# Patient Record
(demographics unavailable — no encounter records)

---

## 2025-01-16 NOTE — REVIEW OF SYSTEMS
[Fever] : no fever [Sore Throat] : no sore throat [Chest Pain] : no chest pain [Shortness Of Breath] : no shortness of breath [Wheezing] : no wheezing [Cough] : no cough [Abdominal Pain] : no abdominal pain [Vomiting] : no vomiting [Constipation] : no constipation [Easy Bleeding] : no tendency for easy bleeding [Easy Bruising] : no tendency for easy bruising

## 2025-01-16 NOTE — HISTORY OF PRESENT ILLNESS
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 The malformation is causing the patient to stay out of all activities and causing her severe pain where she has been to [facility] twice. In the past the knee has become swollen with fluid every time she would have a strep throat infection. Knee was never drained during flare ups.   She has been seen by Dr. Lashay Lewis (ortho) and Dr. Marisa Charlton (radiology) with no procedure completed.    Patient denies recent fever, chills, shortness of breath, chest pain, nausea, vomiting or diarrhea.

## 2025-01-16 NOTE — HISTORY OF PRESENT ILLNESS
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 The malformation is causing the patient to stay out of all activities and causing her severe pain where she has been to [facility] twice. In the past the knee has become swollen with fluid every time she would have a strep throat infection. Knee was never drained during flare ups.   She has been seen by Dr. Lashay Lewis (ortho) and Dr. Marisa Charlton (radiology) with no procedure completed.    Patient denies recent fever, chills, shortness of breath, chest pain, nausea, vomiting or diarrhea.

## 2025-01-16 NOTE — HISTORY OF PRESENT ILLNESS
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 The malformation is causing the patient to stay out of all activities and causing her severe pain where she has been to [facility] twice. In the past the knee has become swollen with fluid every time she would have a strep throat infection. Knee was never drained during flare ups.   She has been seen by Dr. Lashay Lewis (ortho) and Dr. Marisa Charlton (radiology) with no procedure completed.    Patient denies recent fever, chills, shortness of breath, chest pain, nausea, vomiting or diarrhea.

## 2025-01-16 NOTE — CONSULT LETTER
[Dear  ___] : Dear  [unfilled], [Consult Letter:] : I had the pleasure of evaluating your patient, [unfilled]. [Please see my note below.] : Please see my note below. [Consult Closing:] : Thank you very much for allowing me to participate in the care of this patient.  If you have any questions, please do not hesitate to contact me. [Sincerely,] : Sincerely, [FreeTextEntry3] : Juan Diego Banda MD, FSIR, FACR Interventional Radiologist   Executive  Department of Radiology- [facility]    Professor of Radiology [employer] at [facility]  [DrLebron  ___] : Dr. WHITTEN

## 2025-01-16 NOTE — PHYSICAL EXAM
[Alert] : alert [Well Nourished] : well nourished [No Respiratory Distress] : no respiratory distress [No Accessory Muscle Use] : no accessory muscle use [Oriented x3] : oriented to person, place, and time [Normal Affect] : the affect was normal [Restricted in physically strenuous activity but ambulatory and able to carry out work of a light or sedentary nature] : Restricted in physically strenuous activity but ambulatory and able to carry out work of a light or sedentary nature, e.g., light house work, office work [de-identified] : left knee malformation noted on exam. Left foot malformation examined as well. Both malformation are blue in color.

## 2025-01-16 NOTE — CONSULT LETTER
[Dear  ___] : Dear  [unfilled], [Consult Letter:] : I had the pleasure of evaluating your patient, [unfilled]. [Please see my note below.] : Please see my note below. [Consult Closing:] : Thank you very much for allowing me to participate in the care of this patient.  If you have any questions, please do not hesitate to contact me. [Sincerely,] : Sincerely, [FreeTextEntry3] : Juan Diego Banda MD, FSIR, FACR Interventional Radiologist   Executive  Department of Radiology- [facility]    Professor of Radiology [employer] at [facility]  [DrLebron  ___] : Dr. WHITTEN

## 2025-01-16 NOTE — PHYSICAL EXAM
[Alert] : alert [Well Nourished] : well nourished [No Respiratory Distress] : no respiratory distress [No Accessory Muscle Use] : no accessory muscle use [Oriented x3] : oriented to person, place, and time [Normal Affect] : the affect was normal [Restricted in physically strenuous activity but ambulatory and able to carry out work of a light or sedentary nature] : Restricted in physically strenuous activity but ambulatory and able to carry out work of a light or sedentary nature, e.g., light house work, office work [de-identified] : left knee malformation noted on exam. Left foot malformation examined as well. Both malformation are blue in color.

## 2025-01-16 NOTE — REASON FOR VISIT
[Consultation] : a consultation visit [Parent] : parent [FreeTextEntry1] : left knee vascular malformation

## 2025-01-16 NOTE — DATA REVIEWED
[FreeTextEntry1] : MR reviewed with the patient and parents.  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

## 2025-01-16 NOTE — ASSESSMENT
[FreeTextEntry1] : I met with the patient and her parents and reviewed her MRI imaging and assured them that this was a vascular anomaly.  I explained that this is rather extensive disease and there is really no opportunity to "cure" her problem and that therapy for these type of vascular anomalies as directed at symptom relief and control/minimization.  We reviewed the entity of venous malformations and their natural history and there is no question that this patient continues to worsen in terms of her pain and functional ability of the lower extremity.  As there is an intra-articular component to this venous lesion and sclerosant's can damage cartilage extensively, I suggested that we consider a combined procedure with Xavier Cox of orthopedic surgery where we would first perform sclerotherapy and injection of hemostatic agent into the malformation followed by irrigation of the knee joint and possibly resection of a portion of the malformation the same day.  I reviewed some of the risks benefits and alternatives of the procedure and we discussed that therapy for venous anomalies can be somewhat unpredictable but if this goes well, we would consider sclerotherapy for some of the other lesions that are bothering her.  Clearly at this point, the knee is the biggest problem and we furnished them with Dr. Cox's contact information so that they could see him and potentially begin to set up the procedure, if he agrees.  Additionally, we spoke about the use of compression, and we prescribed a compression garment for her which surprisingly, no and that has seen her has discussed with her in the past.  Following a visit with Dr. Cox, we will coordinate with his office to set up the proposed procedure.

## 2025-01-16 NOTE — PHYSICAL EXAM
[Alert] : alert [Well Nourished] : well nourished [No Respiratory Distress] : no respiratory distress [No Accessory Muscle Use] : no accessory muscle use [Oriented x3] : oriented to person, place, and time [Normal Affect] : the affect was normal [Restricted in physically strenuous activity but ambulatory and able to carry out work of a light or sedentary nature] : Restricted in physically strenuous activity but ambulatory and able to carry out work of a light or sedentary nature, e.g., light house work, office work [de-identified] : left knee malformation noted on exam. Left foot malformation examined as well. Both malformation are blue in color.

## 2025-01-16 NOTE — CONSULT LETTER
[Dear  ___] : Dear  [unfilled], [Consult Letter:] : I had the pleasure of evaluating your patient, [unfilled]. [Please see my note below.] : Please see my note below. [Consult Closing:] : Thank you very much for allowing me to participate in the care of this patient.  If you have any questions, please do not hesitate to contact me. [Sincerely,] : Sincerely, [FreeTextEntry3] : Juan Diego Banda MD, FSIR, FACR Interventional Radiologist   Executive  Department of Radiology- [facility]    Professor of Radiology [employer] at [facility]  [DrLebron  ___] : Dr. WHITTEN

## 2025-02-05 NOTE — REVIEW OF SYSTEMS
[Rash] : rash [NI] : Endocrine [Nl] : Hematologic/Lymphatic [Change in Activity] : no change in activity [Fever Above 102] : no fever [Malaise] : no malaise [Cough] : no cough [Headache] : no headache

## 2025-02-05 NOTE — PHYSICAL EXAM
[FreeTextEntry1] : Healthy appearing 14 year-old child. Awake, alert, in no acute distress. Pleasant and cooperative.  Eyes are clear with no sclera abnormalities. External ears, nose and mouth are clear.  Good respiratory effort with no audible wheezing without use of a stethoscope. Ambulates independently with no evidence of antalgia. Good coordination and balance. Able to get on and off exam table without difficulty.   Left lower extremity:  + increased vascular markings with swelling to anterior knee, posterior thigh, posterior calf and lateral dorsal aspect of the foot.  There is some mild discomfort with palpation of the vascularity to the anterior aspect of her knee. She has full range of motion about the hip, knee and ankle joints.  Sensation intact to light touch distally, brisk capillary refill in all digits

## 2025-02-05 NOTE — HISTORY OF PRESENT ILLNESS
[FreeTextEntry1] : Marisa is a 14-year-old young lady who has history for vascular malformation of her left lower extremity.  She was recently seen by Dr. Juan Diego Banda 1 month ago who recommended she follow-up in our office.  She explains that she will be undergoing a sclerotherapy procedure with Dr. Banda and he would like to work in conjunction with our office for possible arthroscopy and excision of the lesion.  She states that she has had increased vascularity's to her lower extremity since she was born.  They are approximately over the posterior aspect of her left thigh, the anterior aspect of the left knee and the posterior aspect of her left lower leg.  Vascularity is also run to the lateral and dorsum of her foot.  She does experience discomfort and swelling in the areas of these vascularities with the anterior knee segment being the most bothersome for her.  Otherwise, she is in her usual state of health.  Here today for further pediatric orthopedic evaluation and management.

## 2025-02-05 NOTE — REASON FOR VISIT
[Consultation] : a consultation visit [Patient] : patient [Parents] : parents [FreeTextEntry1] : Vascular Malformation of Left lower extremity

## 2025-02-05 NOTE — ASSESSMENT
[FreeTextEntry1] : 14-year-old female with left lower extremity vascular malformation with knee pain  The history was obtained today from the child and parent; given the patient's age and/or the child's mental capacity, the history was unreliable and the parent was used as an independent historian.   Marisa and her family have met with Dr. Banda and are planning to undergo sclerotherapy.  We would be working in conjunction with Dr. Banda to perform a possible left knee arthroscopy and resection of a portion of the malformation.  In addition to interventional radiology and our team, I would also like for her to be evaluated by heme-onc team, Dr. Mandel.  I provided family with the contact information as we often work closely with this team for preparing for her surgical care.  I will be in touch with Dr. Mandel and Dr. Banda and we will move forward with the procedure together. This plan was discussed with family and all questions and concerns were addressed today.  I, Sumaya Grace PA-C, have acted as a scribe and documented the above for Dr. Cox  The above documentation completed by the scribe is an accurate record of both my words and actions.

## 2025-03-09 NOTE — HISTORY OF PRESENT ILLNESS
[de-identified] : Marisa presents at 14-years old for consultation for L knee lesion. Mother reports that lesion was noted at birth but was informed "it was ok." As she walks her knee swells, causing limping. Mother reports that "she got nowhere for years" regarding an evaluation. She notes that whenever Marisa has strep throat, her knee would swell and become fluid filled. She was previously seen by an Infectious Disease physician, who advised not to tap her knee as there wasn't an infectious concern. She takes Tylenol or Advil for pain multiple times per day, sometimes just once weekly.   Mother states that Marisa "has no quality of life." She misses school, knee beverly or swells when standing for too long. Pain is 10/10 during a flare up, otherwise baseline is 3-4/10 even when laying down. No flare ups with menses.   She has had several previous evaluations. She was seen at [place] by a vascular group. Mother reports Doppler was done and "veins go nowhere." She was evaluated by cardiology and per mother, her heart is not affected. 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 Marisa took weekly injections in 2019. In August 2024, she slipped and fell on her knee. She was seen at [place] and then by Orthopedist Dr. Guo, who requested MRI, which was done at [facility] August 2024, then saw Dr. Dinesh Calix at [place], who requested another MRI September 2024 and referred to Dr. Marisa Castanon at [place] [facility]. She suggested family call [facility] (family was informed next visit was months away). Mother contacted Dr. Banda, (IR) who also reerred to Dr. Cox (ortho) at [facility]. In December 2024 she had a sudden pain in her whole leg and fell. She was seen at [facility]. She reports EKG, blood work and a Doppler, which was negative. She reports platelets in the 30s. No report of epistaxis, gum bleeding, blood in her urine or stool.    Menarche at 11. She reports being a "heavy bleeder like her mother and sisters." She uses 3-5 pads/day, soaking or 4 days. Mom states that mother is probably anemic.   Due to her leg pain she is not very active. Marisa prefers to sing and read poetry (Jason Flor Sergio is her favorite).  Family history of clots.  - Maternal uncle with a "clot disorder," and has a [place] filter - maternal cousin with PEs and another cousin with clots  - mother - 1 miscarriage - sister - 3-4 miscarraiges

## 2025-03-09 NOTE — HISTORY OF PRESENT ILLNESS
[de-identified] : Marisa presents at 14-years old for consultation for L knee lesion. Mother reports that lesion was noted at birth but was informed "it was ok." As she walks her knee swells, causing limping. Mother reports that "she got nowhere for years" regarding an evaluation. She notes that whenever Marisa has strep throat, her knee would swell and become fluid filled. She was previously seen by an Infectious Disease physician, who advised not to tap her knee as there wasn't an infectious concern. She takes Tylenol or Advil for pain multiple times per day, sometimes just once weekly.   Mother states that Marisa "has no quality of life." She misses school, knee beverly or swells when standing for too long. Pain is 10/10 during a flare up, otherwise baseline is 3-4/10 even when laying down. No flare ups with menses.   She has had several previous evaluations. She was seen at [place] by a vascular group. Mother reports Doppler was done and "veins go nowhere." She was evaluated by cardiology and per mother, her heart is not affected. 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 Marisa took weekly injections in 2019. In August 2024, she slipped and fell on her knee. She was seen at [place] and then by Orthopedist Dr. Guo, who requested MRI, which was done at [facility] August 2024, then saw Dr. Dinesh Calix at [place], who requested another MRI September 2024 and referred to Dr. Marisa Castanon at [place] [facility]. She suggested family call [facility] (family was informed next visit was months away). Mother contacted Dr. Banda, (IR) who also reerred to Dr. Cox ([place] [facility]. In December 2024 she had a sudden pain in her whole leg and fell. She was seen at [facility]. She reports EKG, blood work and a Doppler, which was negative. She reports platelets in the 30s. No report of epistaxis, gum bleeding, blood in her urine or stool.    Menarche at 11. She reports being a "heavy bleeder like her mother and sisters." She uses 3-5 pads/day, soaking or 4 days. Mom states that mother is probably anemic.   Due to her leg pain she is not very active. Marisa prefers to sing and read poetry (Jason Flor Sergio is her favorite).  Family history of clots.  - Maternal uncle with a "clot disorder," and has a [place] filter - maternal cousin with PEs and another cousin with clots  - mother - 1 miscarriage - sister - 3-4 miscarraiges

## 2025-03-09 NOTE — CONSULT LETTER
[Consult Letter:] : I had the pleasure of evaluating your patient, [unfilled]. [Please see my note below.] : Please see my note below. [Consult Closing:] : Thank you very much for allowing me to participate in the care of this patient.  If you have any questions, please do not hesitate to contact me. [Sincerely,] : Sincerely, [FreeTextEntry2] : [facility] [address] [facility] [address] [place] [address] phone [phone]  [FreeTextEntry3] : Dr. Kari Mandel  Section Head Vascular Anomalies Program Oncology Focus Leukemia/Lymphoma [facility] at [facility] Pediatric Hematology Oncology and Stem Cell Transplantation [phone] [place] [address], [address] [place] [address] Phone [phone] Fax [phone] [DrLebron  ___] : Dr. WHITTEN [DrLebron ___] : Dr. WHITTEN

## 2025-03-09 NOTE — CONSULT LETTER
[Consult Letter:] : I had the pleasure of evaluating your patient, [unfilled]. [Please see my note below.] : Please see my note below. [Consult Closing:] : Thank you very much for allowing me to participate in the care of this patient.  If you have any questions, please do not hesitate to contact me. [Sincerely,] : Sincerely, [FreeTextEntry2] : [facility] [address] [facility] [facility] [address] [place] [address] phone [phone]  [FreeTextEntry3] : Dr. Kari Mandel  Section Head Vascular Anomalies Program Oncology Focus Leukemia/Lymphoma [facility] at [facility] Pediatric Hematology Oncology and Stem Cell Transplantation [phone] [place] [address], [address] [place] [address] Phone [phone] Fax [phone] [DrLebron  ___] : Dr. WHITTEN [DrLebron ___] : Dr. WHITTEN

## 2025-03-09 NOTE — REASON FOR VISIT
[Consultation Visit] : a consultation visit for [Patient] : patient [Parents] : parents [Medical Records] : medical records [FreeTextEntry2] : L knee lesion

## 2025-03-25 NOTE — HISTORY OF PRESENT ILLNESS
[de-identified] : Marisa presents at 14-years old for consultation for L knee lesion. Mother reports that lesion was noted at birth but was informed "it was ok." As she walks her knee swells, causing limping. Mother reports that "she got nowhere for years" regarding an evaluation. She notes that whenever Marisa has strep throat, her knee would swell and become fluid filled. She was previously seen by an Infectious Disease physician, who advised not to tap her knee as there wasn't an infectious concern. She takes Tylenol or Advil for pain multiple times per day, sometimes just once weekly.  Mother states that Marisa "has no quality of life." She misses school, knee beverly or swells when standing for too long. Pain is 10/10 during a flare up, otherwise baseline is 3-4/10 even when laying down. No flare ups with menses.  She has had several previous evaluations. She was seen at [place] by a vascular group. Mother reports Doppler was done and "veins go nowhere." She was evaluated by cardiology and per mother, her heart is not affected. 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 Marisa took weekly injections in 2019. In August 2024, she slipped and fell on her knee. She was seen at [place] and then by Orthopedist Dr. Guo, who requested MRI, which was done at [facility] August 2024, then saw Dr. Dinesh Calix at [place], who requested another MRI September 2024 and referred to Dr. Marisa Castanon at [place] [facility]. She suggested family call [facility] (family was informed next visit was months away). Mother contacted Dr. Banda, (IR) who also reerred to Dr. Cox (ortho) at [facility]. In December 2024 she had a sudden pain in her whole leg and fell. She was seen at [facility]. She reports EKG, blood work and a Doppler, which was negative. She reports platelets in the 30s. No report of epistaxis, gum bleeding, blood in her urine or stool.  Menarche at 11. She reports being a "heavy bleeder like her mother and sisters." She uses 3-5 pads/day, soaking or 4 days. Mom states that mother is probably anemic.  Due to her leg pain she is not very active. Marisa prefers to sing and read poetry (Jason Flor Sergio is her favorite). Family history of clots. - Maternal uncle with a "clot disorder," and has a [place] filter - maternal cousin with PEs and another cousin with clots - mother - 1 miscarriage - sister - 3-4 miscarraiges.  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  Mother - healthy; 3 older sibs, no h/o DVT/ PE; Daughter - recurrent miscarriages; conceived children after that; MGM- still born; Grisel, Colombian, Sammarinese, Irish. Marisa - easy bruising, nose bleeds; menses - lasting 5-6 days, 2-3 times /day, no nighttime changes, currently on oral iron; no surgeries,

## 2025-03-25 NOTE — HISTORY OF PRESENT ILLNESS
[de-identified] : Marisa presents at 14-years old for consultation for L knee lesion. Mother reports that lesion was noted at birth but was informed "it was ok." As she walks her knee swells, causing limping. Mother reports that "she got nowhere for years" regarding an evaluation. She notes that whenever Marisa has strep throat, her knee would swell and become fluid filled. She was previously seen by an Infectious Disease physician, who advised not to tap her knee as there wasn't an infectious concern. She takes Tylenol or Advil for pain multiple times per day, sometimes just once weekly.  Mother states that Marisa "has no quality of life." She misses school, knee beverly or swells when standing for too long. Pain is 10/10 during a flare up, otherwise baseline is 3-4/10 even when laying down. No flare ups with menses.  She has had several previous evaluations. She was seen at [place] by a vascular group. Mother reports Doppler was done and "veins go nowhere." She was evaluated by cardiology and per mother, her heart is not affected. 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 Marisa took weekly injections in 2019. In August 2024, she slipped and fell on her knee. She was seen at [place] and then by Orthopedist Dr. Guo, who requested MRI, which was done at [facility] August 2024, then saw Dr. Dinesh Calix at [place], who requested another MRI September 2024 and referred to Dr. Marisa Castanon at [place]. She suggested family call [facility] (family was informed next visit was months away). Mother contacted Dr. Banda, (IR) who also reerred to Dr. Cox (ortho) at [facility]. In December 2024 she had a sudden pain in her whole leg and fell. She was seen at [facility]. She reports EKG, blood work and a Doppler, which was negative. She reports platelets in the 30s. No report of epistaxis, gum bleeding, blood in her urine or stool.  Menarche at 11. She reports being a "heavy bleeder like her mother and sisters." She uses 3-5 pads/day, soaking or 4 days. Mom states that mother is probably anemic.  Due to her leg pain she is not very active. Marisa prefers to sing and read poetry (Jason Flor Sergio is her favorite). Family history of clots. - Maternal uncle with a "clot disorder," and has a [place] filter - maternal cousin with PEs and another cousin with clots - mother - 1 miscarriage - sister - 3-4 miscarraiges.  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  Mother - healthy; 3 older sibs, no h/o DVT/ PE; Daughter - recurrent miscarriages; conceived children after that; MGM- still born; Grisel, Paraguayan, Tuvaluan, Mongolian. Marisa - easy bruising, nose bleeds; menses - lasting 5-6 days, 2-3 times /day, no nighttime changes, currently on oral iron; no surgeries,

## 2025-03-25 NOTE — HISTORY OF PRESENT ILLNESS
[de-identified] : Marisa presents at 14-years old for consultation for L knee lesion. Mother reports that lesion was noted at birth but was informed "it was ok." As she walks her knee swells, causing limping. Mother reports that "she got nowhere for years" regarding an evaluation. She notes that whenever Marisa has strep throat, her knee would swell and become fluid filled. She was previously seen by an Infectious Disease physician, who advised not to tap her knee as there wasn't an infectious concern. She takes Tylenol or Advil for pain multiple times per day, sometimes just once weekly.  Mother states that Marisa "has no quality of life." She misses school, knee beverly or swells when standing for too long. Pain is 10/10 during a flare up, otherwise baseline is 3-4/10 even when laying down. No flare ups with menses.  She has had several previous evaluations. She was seen at [place] by a vascular group. Mother reports Doppler was done and "veins go nowhere." She was evaluated by cardiology and per mother, her heart is not affected. 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 Marisa took weekly injections in 2019. In August 2024, she slipped and fell on her knee. She was seen at [place] and then by Orthopedist Dr. Guo, who requested MRI, which was done at [facility] August 2024, then saw Dr. Dinesh Calix at [place], who requested another MRI September 2024 and referred to Dr. Marisa Castanon at [facility]. She suggested family call [facility] (family was informed next visit was months away). Mother contacted Dr. Banda, (IR) who also reerred to Dr. Cox (ortho) at [facility]. In December 2024 she had a sudden pain in her whole leg and fell. She was seen at [facility]. She reports EKG, blood work and a Doppler, which was negative. She reports platelets in the 30s. No report of epistaxis, gum bleeding, blood in her urine or stool.  Menarche at 11. She reports being a "heavy bleeder like her mother and sisters." She uses 3-5 pads/day, soaking or 4 days. Mom states that mother is probably anemic.  Due to her leg pain she is not very active. Marisa prefers to sing and read poetry (Jason Flor Sergio is her favorite). Family history of clots. - Maternal uncle with a "clot disorder," and has a [place] filter - maternal cousin with PEs and another cousin with clots - mother - 1 miscarriage - sister - 3-4 miscarraiges.  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  Mother - healthy; 3 older sibs, no h/o DVT/ PE; Daughter - recurrent miscarriages; conceived children after that; MGM- still born; Grisel, Guatemalan, Slovak, Kyrgyz. Marisa - easy bruising, nose bleeds; menses - lasting 5-6 days, 2-3 times /day, no nighttime changes, currently on oral iron; no surgeries,

## 2025-06-19 NOTE — REASON FOR VISIT
[Follow-Up Visit] : a follow-up visit for [Other ___] : [unfilled] [Patient] : patient [Mother] : mother [Medical Records] : medical records

## 2025-06-25 NOTE — PHYSICAL EXAM
[Normal] : affect appropriate [de-identified] : Left knee with pronounced vasculature and generalized swelling, circumferentially, most pronounced superior to the knee. Pronounced vessels spread down the anterolateral leg though lighter there, and then more pronounced again on the left foot.

## 2025-06-25 NOTE — HISTORY OF PRESENT ILLNESS
[No Feeding Issues] : no feeding issues at this time [de-identified] : Marisa presented at 14 years old in 2/2025 for evaluation of left knee lesion Mother reports that lesion was noted at birth but was informed "it was ok." As she walks her knee swells, causing limping. Mother reports that "she got nowhere for years" regarding an evaluation. She notes that whenever Marisa has strep throat, her knee would swell and become fluid filled. She was previously seen by an Infectious Disease physician, who advised not to tap her knee as there wasn't an infectious concern. She takes Tylenol or Advil for pain multiple times per day, sometimes just once weekly.  Mother states that Marisa "has no quality of life." She misses school, knee ebverly or swells when standing for too long. Pain is 10/10 during a flare up, otherwise baseline is 3-4/10 even when laying down. No flare ups with menses.  She has had several previous evaluations. She was seen at [place] by a vascular group. Mother reports Doppler was done and "veins go nowhere." She was evaluated by cardiology and per mother, her heart is not affected. 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 Marisa took weekly injections in 2019. In August 2024, she slipped and fell on her knee. She was seen at [place] and then by Orthopedist Dr. Guo, who requested MRI, which was done at [facility] August 2024, then saw Dr. Dinesh Calix at [place], who requested another MRI September 2024 and referred to Dr. Marisa Castanon at [place] [facility]. She suggested family call [facility] (family was informed next visit was months away). Mother contacted Dr. Banda, (IR) who also reerred to Dr. Cox (ortho) at [facility]. In December 2024 she had a sudden pain in her whole leg and fell. She was seen at [facility]. She reports EKG, blood work and a Doppler, which was negative. She reports platelets in the 30s. No report of epistaxis, gum bleeding, blood in her urine or stool.  Menarche at 11. She reports being a "heavy bleeder like her mother and sisters." She uses 3-5 pads/day, soaking or 4 days. Mom states that mother is probably anemic.  Due to her leg pain she is not very active. Marisa prefers to sing and read poetry (Jason Hill is her favorite). Family history of clots. - Maternal uncle with a "clot disorder," and has a [place] filter - maternal cousin with PEs and another cousin with clots - mother - 1 miscarriage - sister - 3-4 miscarraiges. [de-identified] : Since her last visit, she saw Dr. Aragon of Hematology for thrombocytopenia workup. Most of her workup was normal with the exception of an elevated homocysteine and she recommended having it repeated in a fasting state. Marisa was prescribed oral iron however it made her extremely constipated after about 2 weeks of taking it so she stopped it.  Marisa has been continuing to experience debilitating symptoms including swelling/pain which have limited her ability to go to school. She also now has left lower back pain. Mom thinks that the lower back may have been captured in one of the previous MRI images, though not reported. Her swelling is worst in the knee region. The swelling and pain come and go with no alleviating or provoking factors, and it can change from swollen to not throughout the course of the day. She wears compression stockings at times though it is more difficult with the weather getting warmer. She does wear long socks when she can.  She is very much looking forward to scheduling a procedure with Dr. Cox and Dr. Banda to address the venous malformation.

## 2025-06-25 NOTE — REVIEW OF SYSTEMS
[Joint Pain] : joint pain [Joint Swelling] : joint swelling [Negative] : Allergic/Immunologic [Fever] : no fever [Decreased Appetite] : normal appetite [Bleeding] : no bleeding [Bruising] : no bruising [de-identified] : see HPI and Interval History

## 2025-06-25 NOTE — HISTORY OF PRESENT ILLNESS
[No Feeding Issues] : no feeding issues at this time [de-identified] : Marisa presented at 14 years old in 2/2025 for evaluation of left knee lesion Mother reports that lesion was noted at birth but was informed "it was ok." As she walks her knee swells, causing limping. Mother reports that "she got nowhere for years" regarding an evaluation. She notes that whenever Marisa has strep throat, her knee would swell and become fluid filled. She was previously seen by an Infectious Disease physician, who advised not to tap her knee as there wasn't an infectious concern. She takes Tylenol or Advil for pain multiple times per day, sometimes just once weekly.  Mother states that Marisa "has no quality of life." She misses school, knee beverly or swells when standing for too long. Pain is 10/10 during a flare up, otherwise baseline is 3-4/10 even when laying down. No flare ups with menses.  She has had several previous evaluations. She was seen at [place] by a vascular group. Mother reports Doppler was done and "veins go nowhere." She was evaluated by cardiology and per mother, her heart is not affected. 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 Marisa took weekly injections in 2019. In August 2024, she slipped and fell on her knee. She was seen at [place] and then by Orthopedist Dr. Guo, who requested MRI, which was done at [facility] August 2024, then saw Dr. Dinesh Calix at [place], who requested another MRI September 2024 and referred to Dr. Marisa Castanon at [place] [facility]. She suggested family call [facility] (family was informed next visit was months away). Mother contacted Dr. Banda, (IR) who also reerred to Dr. Cox ([place] [facility]. In December 2024 she had a sudden pain in her whole leg and fell. She was seen at [facility]. She reports EKG, blood work and a Doppler, which was negative. She reports platelets in the 30s. No report of epistaxis, gum bleeding, blood in her urine or stool.  Menarche at 11. She reports being a "heavy bleeder like her mother and sisters." She uses 3-5 pads/day, soaking or 4 days. Mom states that mother is probably anemic.  Due to her leg pain she is not very active. Marisa prefers to sing and read poetry (Jason Hill is her favorite). Family history of clots. - Maternal uncle with a "clot disorder," and has a [place] filter - maternal cousin with PEs and another cousin with clots - mother - 1 miscarriage - sister - 3-4 miscarraiges. [de-identified] : Since her last visit, she saw Dr. Aragon of Hematology for thrombocytopenia workup. Most of her workup was normal with the exception of an elevated homocysteine and she recommended having it repeated in a fasting state. Marisa was prescribed oral iron however it made her extremely constipated after about 2 weeks of taking it so she stopped it.  Marisa has been continuing to experience debilitating symptoms including swelling/pain which have limited her ability to go to school. She also now has left lower back pain. Mom thinks that the lower back may have been captured in one of the previous MRI images, though not reported. Her swelling is worst in the knee region. The swelling and pain come and go with no alleviating or provoking factors, and it can change from swollen to not throughout the course of the day. She wears compression stockings at times though it is more difficult with the weather getting warmer. She does wear long socks when she can.  She is very much looking forward to scheduling a procedure with Dr. Cox and Dr. Banda to address the venous malformation.

## 2025-06-25 NOTE — REVIEW OF SYSTEMS
[Joint Pain] : joint pain [Joint Swelling] : joint swelling [Negative] : Allergic/Immunologic [Fever] : no fever [Decreased Appetite] : normal appetite [Bleeding] : no bleeding [Bruising] : no bruising [de-identified] : see HPI and Interval History

## 2025-06-25 NOTE — PHYSICAL EXAM
[Normal] : affect appropriate [de-identified] : Left knee with pronounced vasculature and generalized swelling, circumferentially, most pronounced superior to the knee. Pronounced vessels spread down the anterolateral leg though lighter there, and then more pronounced again on the left foot.